# Patient Record
Sex: MALE | URBAN - METROPOLITAN AREA
[De-identification: names, ages, dates, MRNs, and addresses within clinical notes are randomized per-mention and may not be internally consistent; named-entity substitution may affect disease eponyms.]

---

## 2023-02-23 ENCOUNTER — APPOINTMENT (RX ONLY)
Dept: URBAN - METROPOLITAN AREA CLINIC 24 | Facility: CLINIC | Age: 43
Setting detail: DERMATOLOGY
End: 2023-02-23

## 2023-02-23 DIAGNOSIS — Z41.9 ENCOUNTER FOR PROCEDURE FOR PURPOSES OTHER THAN REMEDYING HEALTH STATE, UNSPECIFIED: ICD-10-CM

## 2023-02-23 DIAGNOSIS — L71.8 OTHER ROSACEA: ICD-10-CM

## 2023-02-23 PROCEDURE — ? COSMETIC CONSULTATION: SKIN CARE PRODUCTS AND SERVICES

## 2023-02-23 PROCEDURE — ? SCITON BBL

## 2023-02-23 PROCEDURE — ? COSMETIC CONSULTATION: BBL

## 2023-02-23 ASSESSMENT — LOCATION DETAILED DESCRIPTION DERM
LOCATION DETAILED: RIGHT SUPERIOR MEDIAL BUCCAL CHEEK
LOCATION DETAILED: LEFT INFERIOR MEDIAL FOREHEAD
LOCATION DETAILED: RIGHT CENTRAL MALAR CHEEK

## 2023-02-23 ASSESSMENT — LOCATION SIMPLE DESCRIPTION DERM
LOCATION SIMPLE: LEFT FOREHEAD
LOCATION SIMPLE: RIGHT CHEEK

## 2023-02-23 ASSESSMENT — LOCATION ZONE DERM: LOCATION ZONE: FACE

## 2023-02-23 NOTE — PROCEDURE: SCITON BBL
Repetition Rate (Hz): 10
Pulse Duration Units: milliseconds
Post-Care Instructions: Patient denies steroids, antibiotics, fake tans, spray tans in the past 7 days The treatment areas were thoroughly cleaned. Any exposed at risk hair that was not to be treated was covered in white paper tape. Clear ultrasound gel was applied to the treatment area. The area was treated with no immediate stacking of pulses. The patient tolerated the procedure well. Ice-chilled washclothes were applied to the treatment area for comfort. Post care was reviewed with the patient. Patient should stay away from the sun and wear sun protection until treated areas are fully healed.
Pulse Duration: 20
Spot Size: Finesse Adapter Size: 15 x 15 mm square
Pulse Duration: 30
Hide Repetition Rate?: No
Spot Size: Finesse Adapter Size: 15 x 45 mm (No Finesse Adapter)
Prepaid Number Of Treatments: 3
Treated Area: small area
Treatment Number: 1
Spot Size: Finesse Adapter Size: 11 mm round
Treated Area: medium area
Cooling (In C): 15
Passes: 2
Anesthesia Volume In Cc: 0
Detail Level: Zone
Fluence (J/Cm2): 12
Additional Comments (Optional): gentle area
Preprocedure Text: The treatment areas were thoroughly cleaned. Any exposed at risk hair that was not to be treated was covered in white paper tape. Clear ultrasound gel was applied to the treatment area. The area was treated with no immediate stacking of pulses.
Cooling ?: Yes
Post Procedure Text: The patient tolerated the procedure well. Ice-chilled washclothes were applied to the treatment area for comfort. Post care was reviewed with the patient.
Fluence (J/Cm2): 25
Additional Comments (Optional): spot treatment
Additional Comments (Optional): broken capillary
Device Serial Number (Optional): [id number]
Consent: Written consent obtained, risks reviewed including but not limited to crusting, scabbing, blistering, scarring, darker or lighter pigmentary change, bruising, and/or incomplete response.\\n* Medical endpoint achieved and applied phyto corrective gel and spf.\\n* Pt purchased 3 pkg BBL Rosacea and phyto corrective gel
Price (Use Numbers Only, No Special Characters Or $): 185
Fluence (J/Cm2): 13

## 2023-02-23 NOTE — HPI: COSMETIC (LASER RED SPOTS)
Have You Had Red Spots Treated With Laser Before?: has had previous treatments
When Outside In The Sun, Do You...: mostly burns, rarely tans
Number Of Treatments: 3
When Was Your Last Laser Treatment?: 5 yrs ago

## 2023-02-23 NOTE — HPI: COSMETIC CONSULTATION
When Outside In The Sun, Do You...: mostly burns, rarely tans
Additional History: The patient present for consultation and treatment

## 2023-03-23 ENCOUNTER — APPOINTMENT (RX ONLY)
Dept: URBAN - METROPOLITAN AREA CLINIC 24 | Facility: CLINIC | Age: 43
Setting detail: DERMATOLOGY
End: 2023-03-23

## 2023-03-23 DIAGNOSIS — L71.8 OTHER ROSACEA: ICD-10-CM

## 2023-03-23 PROCEDURE — ? SCITON BBL

## 2023-03-23 ASSESSMENT — LOCATION SIMPLE DESCRIPTION DERM: LOCATION SIMPLE: LEFT FOREHEAD

## 2023-03-23 ASSESSMENT — LOCATION DETAILED DESCRIPTION DERM: LOCATION DETAILED: LEFT FOREHEAD

## 2023-03-23 ASSESSMENT — LOCATION ZONE DERM: LOCATION ZONE: FACE

## 2023-03-23 NOTE — PROCEDURE: SCITON BBL
Cooling ?: Yes
Fluence (J/Cm2): 15
Pulse Duration: 20
Passes: 2
Treated Area: small area
Passes: 1
Pulse Duration Units: milliseconds
Spot Size: Finesse Adapter Size: 11 mm round
Additional Comments (Optional): spot treatment
Spot Size: Finesse Adapter Size: 7 mm round
Detail Level: Detailed
Consent: Written consent obtained, risks reviewed including but not limited to crusting, scabbing, blistering, scarring, darker or lighter pigmentary change, bruising, and/or incomplete response.\\n\\n* no steroid or antibiotics in the past 7 days. No immunizations in the past 30 days. No unprotected sun expose in last 2 weeks\\n* no gym, no sauna, no super hot showers, no boiling pots of water, or cooking with the oven for 24 hours, resume regular skincare the next day if skin is no longer tingling or red\\n* Pt well tolerated today’s s treatment and medical endpoint achieved, applied phyto mask to calm down the skin \\n* today’s 2/3 pkg
Anesthesia Volume In Cc: 0
Spot Size: Finesse Adapter Size: 15 x 15 mm square
Post-Care Instructions: Patient denies steroids, antibiotics, fake tans, spray tans in the past 7 days The treatment areas were thoroughly cleaned. Any exposed at risk hair that was not to be treated was covered in white paper tape. Clear ultrasound gel was applied to the treatment area. The area was treated with no immediate stacking of pulses. The patient tolerated the procedure well. Ice-chilled washclothes were applied to the treatment area for comfort. Post care was reviewed with the patient. Patient should stay away from the sun and wear sun protection until treated areas are fully healed.
Preprocedure Text: The treatment areas were thoroughly cleaned. Any exposed at risk hair that was not to be treated was covered in white paper tape. Clear ultrasound gel was applied to the treatment area. The area was treated with no immediate stacking of pulses.
Repetition Rate (Hz): 10
Hide Repetition Rate?: No
Spot Size: Finesse Adapter Size: 15 x 45 mm (No Finesse Adapter)
Cooling (In C): 25
Fluence (J/Cm2): 14
Post Procedure Text: The patient tolerated the procedure well. Ice-chilled washclothes were applied to the treatment area for comfort. Post care was reviewed with the patient.
Fluence (J/Cm2): 12
Fluence (J/Cm2): 22
Pulse Duration: 30

## 2023-04-20 ENCOUNTER — APPOINTMENT (RX ONLY)
Dept: URBAN - METROPOLITAN AREA CLINIC 24 | Facility: CLINIC | Age: 43
Setting detail: DERMATOLOGY
End: 2023-04-20

## 2023-04-20 DIAGNOSIS — L71.8 OTHER ROSACEA: ICD-10-CM

## 2023-04-20 PROCEDURE — ? SCITON BBL

## 2023-04-20 ASSESSMENT — LOCATION ZONE DERM: LOCATION ZONE: FACE

## 2023-04-20 ASSESSMENT — LOCATION SIMPLE DESCRIPTION DERM: LOCATION SIMPLE: LEFT CHEEK

## 2023-04-20 ASSESSMENT — LOCATION DETAILED DESCRIPTION DERM: LOCATION DETAILED: LEFT SUPERIOR CENTRAL MALAR CHEEK

## 2023-04-20 NOTE — PROCEDURE: SCITON BBL
Preprocedure Text: The treatment areas were thoroughly cleaned. Any exposed at risk hair that was not to be treated was covered in white paper tape. Clear ultrasound gel was applied to the treatment area. The area was treated with no immediate stacking of pulses.
Repetition Rate (Hz): 10
Spot Size: Finesse Adapter Size: 11 mm round
Cooling ?: Yes
Pulse Duration: 15
Fluence (J/Cm2): 25
Pulse Duration Units: milliseconds
Cooling (In C): 20
Detail Level: Detailed
Post Procedure Text: The patient tolerated the procedure well. Ice-chilled washclothes were applied to the treatment area for comfort. Post care was reviewed with the patient.
Post-Care Instructions: Patient denies steroids, antibiotics, fake tans, spray tans in the past 7 days The treatment areas were thoroughly cleaned. Any exposed at risk hair that was not to be treated was covered in white paper tape. Clear ultrasound gel was applied to the treatment area. The area was treated with no immediate stacking of pulses. The patient tolerated the procedure well. Ice-chilled washclothes were applied to the treatment area for comfort. Post care was reviewed with the patient. Patient should stay away from the sun and wear sun protection until treated areas are fully healed.
Passes: 1
Treated Area: small area
Hide Repetition Rate?: No
Fluence (J/Cm2): 22
Pulse Duration: 30
Spot Size: Finesse Adapter Size: 15 x 15 mm square
Fluence (J/Cm2): 14
Consent: Written consent obtained, risks reviewed including but not limited to crusting, scabbing, blistering, scarring, darker or lighter pigmentary change, bruising, and/or incomplete response.\\n\\n* no steroid or antibiotics in the past 7 days. No immunizations in the past 30 days. No unprotected sun expose in last 2 weeks\\n* no gym, no sauna, no super hot showers, no boiling pots of water, or cooking with the oven for 24 hours, resume regular skincare the next day if skin is no longer tingling \\n* pt tolerated todays treatment well and endpoint achieved. Applied phyto mask to calm down the skin. Epidermal repair, isdin spf\\n* 3/3 pkg\\n* pt will do maintenance treatment quarterly.
Additional Comments (Optional): spot treatment
Treated Area: medium area
Passes: 2
Spot Size: Finesse Adapter Size: 15 x 45 mm (No Finesse Adapter)
Prepaid Number Of Treatments: 3
Anesthesia Volume In Cc: 0

## 2023-10-19 ENCOUNTER — APPOINTMENT (RX ONLY)
Dept: URBAN - METROPOLITAN AREA CLINIC 24 | Facility: CLINIC | Age: 43
Setting detail: DERMATOLOGY
End: 2023-10-19

## 2023-10-19 DIAGNOSIS — L71.8 OTHER ROSACEA: ICD-10-CM

## 2023-10-19 PROCEDURE — ? SCITON BBL

## 2023-10-19 ASSESSMENT — LOCATION DETAILED DESCRIPTION DERM: LOCATION DETAILED: LEFT MEDIAL FOREHEAD

## 2023-10-19 ASSESSMENT — LOCATION ZONE DERM: LOCATION ZONE: FACE

## 2023-10-19 ASSESSMENT — LOCATION SIMPLE DESCRIPTION DERM: LOCATION SIMPLE: LEFT FOREHEAD

## 2023-10-19 NOTE — PROCEDURE: SCITON BBL
Pulse Duration: 20
Fluence (J/Cm2): 14
Post-Care Instructions: Patient denies steroids, antibiotics, fake tans, spray tans in the past 7 days The treatment areas were thoroughly cleaned. Any exposed at risk hair that was not to be treated was covered in white paper tape. Clear ultrasound gel was applied to the treatment area. The area was treated with no immediate stacking of pulses. The patient tolerated the procedure well. Ice-chilled washclothes were applied to the treatment area for comfort. Post care was reviewed with the patient. Patient should stay away from the sun and wear sun protection until treated areas are fully healed.
Treated Area: medium area
Passes: 1
Pulse Duration Units: milliseconds
Cooling ?: Yes
Cooling (In C): 15
Hide Repetition Rate?: No
Spot Size: Finesse Adapter Size: 15 x 15 mm square
Passes: 2
Repetition Rate (Hz): 10
Spot Size: Finesse Adapter Size: 15 x 45 mm (No Finesse Adapter)
Spot Size: Finesse Adapter Size: 7 mm round
Anesthesia Volume In Cc: 0
Preprocedure Text: The treatment areas were thoroughly cleaned. Any exposed at risk hair that was not to be treated was covered in white paper tape. Clear ultrasound gel was applied to the treatment area. The area was treated with no immediate stacking of pulses.
Fluence (J/Cm2): 25
Post Procedure Text: The patient tolerated the procedure well. Ice-chilled washclothes were applied to the treatment area for comfort. Post care was reviewed with the patient.
Treated Area: small area
Treatment Number: 4
Spot Size: Finesse Adapter Size: 11 mm round
Pulse Duration: 30
Detail Level: Detailed
Consent: Written consent obtained, risks reviewed including but not limited to crusting, scabbing, blistering, scarring, darker or lighter pigmentary change, bruising, and/or incomplete response.\\n\\n* No unprotected sun expose in last 2 weeks\\n* no gym, no sauna, no super hot showers, no boiling pots of water, or cooking with the oven for 24 hours, resume regular skincare the next day if skin is no longer tingling \\n* pt tolerated todays treatment well and endpoint achieved, applied 5 min phyto mask to remove heat and applied finishing product: phyto gel, triple lipid, isdin spf.\\n* Today treatment was maintain his rosacea and schedule next appointment 3 months a part

## 2024-12-30 ENCOUNTER — APPOINTMENT (OUTPATIENT)
Dept: URBAN - METROPOLITAN AREA CLINIC 24 | Facility: CLINIC | Age: 44
Setting detail: DERMATOLOGY
End: 2024-12-30

## 2024-12-30 DIAGNOSIS — Z41.9 ENCOUNTER FOR PROCEDURE FOR PURPOSES OTHER THAN REMEDYING HEALTH STATE, UNSPECIFIED: ICD-10-CM

## 2024-12-30 PROCEDURE — ? SCITON BBL

## 2024-12-30 PROCEDURE — ? OTHER (COSMETIC)

## 2024-12-30 ASSESSMENT — LOCATION DETAILED DESCRIPTION DERM
LOCATION DETAILED: RIGHT SUPERIOR CENTRAL MALAR CHEEK
LOCATION DETAILED: RIGHT INFERIOR CENTRAL MALAR CHEEK

## 2024-12-30 ASSESSMENT — LOCATION ZONE DERM: LOCATION ZONE: FACE

## 2024-12-30 ASSESSMENT — LOCATION SIMPLE DESCRIPTION DERM: LOCATION SIMPLE: RIGHT CHEEK

## 2024-12-30 NOTE — PROCEDURE: SCITON BBL
Pulse Duration Units: milliseconds
Passes: 1
Fluence (J/Cm2): 25
Add Treatment 4?: no
Spot Size: Finesse Adapter Size: 15 x 15 mm square
Add Treatment 2?: yes
Cooling (In C): 20
Consent: Written consent obtained, risks reviewed including but not limited to crusting, scabbing, blistering, scarring, darker or lighter pigmentary change, bruising, and/or incomplete response.
Spot Size: Finesse Adapter Size: 15 x 45 mm (No Finesse Adapter)
Anesthesia Volume In Cc: 0
Cooling (In C): 15
Post-Care Instructions: I reviewed with the patient in detail post-care instructions. Patient should stay away from the sun and wear sun protection until treated areas are fully healed.
Fluence (J/Cm2): 16
Fluence (J/Cm2): 12
Preprocedure Text: The treatment areas was thoroughly cleaned. Clear ultrasound gel was applied to the treatment area. The area was treated with no immediate stacking of pulses.
Spot Size: Finesse Adapter Size: 7 mm round
Detail Level: Generalized
Post Procedure Text: The patient tolerated the procedure well. Ice pack was applied to the treatment area for comfort. Post care was reviewed with the patient.
Repetition Rate (Hz): 10

## 2024-12-30 NOTE — PROCEDURE: OTHER (COSMETIC)
Detail Level: Zone
Other (Free Text): Pt comes in for bbl for rosacea as needed. Pt was cleansed, ultrasound gel applied and eye shields given. Bbl preformed, gel removed phyto gel and spf pyshical fusion applied